# Patient Record
Sex: MALE | Race: ASIAN | NOT HISPANIC OR LATINO | URBAN - METROPOLITAN AREA
[De-identification: names, ages, dates, MRNs, and addresses within clinical notes are randomized per-mention and may not be internally consistent; named-entity substitution may affect disease eponyms.]

---

## 2019-12-31 ENCOUNTER — EMERGENCY (EMERGENCY)
Facility: HOSPITAL | Age: 19
LOS: 1 days | Discharge: HOME | End: 2019-12-31
Attending: EMERGENCY MEDICINE | Admitting: EMERGENCY MEDICINE
Payer: SELF-PAY

## 2019-12-31 VITALS
HEART RATE: 78 BPM | OXYGEN SATURATION: 100 % | RESPIRATION RATE: 16 BRPM | DIASTOLIC BLOOD PRESSURE: 82 MMHG | TEMPERATURE: 98 F | SYSTOLIC BLOOD PRESSURE: 138 MMHG

## 2019-12-31 LAB
ALBUMIN SERPL ELPH-MCNC: 4.5 G/DL — SIGNIFICANT CHANGE UP (ref 3.3–5)
ALP SERPL-CCNC: 57 U/L — SIGNIFICANT CHANGE UP (ref 40–120)
ALT FLD-CCNC: 17 U/L — SIGNIFICANT CHANGE UP (ref 4–41)
ANION GAP SERPL CALC-SCNC: 12 MMO/L — SIGNIFICANT CHANGE UP (ref 7–14)
AST SERPL-CCNC: 19 U/L — SIGNIFICANT CHANGE UP (ref 4–40)
BASOPHILS # BLD AUTO: 0.02 K/UL — SIGNIFICANT CHANGE UP (ref 0–0.2)
BASOPHILS NFR BLD AUTO: 0.2 % — SIGNIFICANT CHANGE UP (ref 0–2)
BILIRUB SERPL-MCNC: 0.3 MG/DL — SIGNIFICANT CHANGE UP (ref 0.2–1.2)
BUN SERPL-MCNC: 13 MG/DL — SIGNIFICANT CHANGE UP (ref 7–23)
CALCIUM SERPL-MCNC: 9 MG/DL — SIGNIFICANT CHANGE UP (ref 8.4–10.5)
CHLORIDE SERPL-SCNC: 101 MMOL/L — SIGNIFICANT CHANGE UP (ref 98–107)
CO2 SERPL-SCNC: 23 MMOL/L — SIGNIFICANT CHANGE UP (ref 22–31)
CREAT SERPL-MCNC: 0.75 MG/DL — SIGNIFICANT CHANGE UP (ref 0.5–1.3)
EOSINOPHIL # BLD AUTO: 0.08 K/UL — SIGNIFICANT CHANGE UP (ref 0–0.5)
EOSINOPHIL NFR BLD AUTO: 0.6 % — SIGNIFICANT CHANGE UP (ref 0–6)
GLUCOSE SERPL-MCNC: 83 MG/DL — SIGNIFICANT CHANGE UP (ref 70–99)
HCT VFR BLD CALC: 45.4 % — SIGNIFICANT CHANGE UP (ref 39–50)
HGB BLD-MCNC: 14.9 G/DL — SIGNIFICANT CHANGE UP (ref 13–17)
IMM GRANULOCYTES NFR BLD AUTO: 0.2 % — SIGNIFICANT CHANGE UP (ref 0–1.5)
LIDOCAIN IGE QN: 18.6 U/L — SIGNIFICANT CHANGE UP (ref 7–60)
LYMPHOCYTES # BLD AUTO: 16.3 % — SIGNIFICANT CHANGE UP (ref 13–44)
LYMPHOCYTES # BLD AUTO: 2.01 K/UL — SIGNIFICANT CHANGE UP (ref 1–3.3)
MCHC RBC-ENTMCNC: 30.3 PG — SIGNIFICANT CHANGE UP (ref 27–34)
MCHC RBC-ENTMCNC: 32.8 % — SIGNIFICANT CHANGE UP (ref 32–36)
MCV RBC AUTO: 92.5 FL — SIGNIFICANT CHANGE UP (ref 80–100)
MONOCYTES # BLD AUTO: 0.92 K/UL — HIGH (ref 0–0.9)
MONOCYTES NFR BLD AUTO: 7.5 % — SIGNIFICANT CHANGE UP (ref 2–14)
NEUTROPHILS # BLD AUTO: 9.27 K/UL — HIGH (ref 1.8–7.4)
NEUTROPHILS NFR BLD AUTO: 75.2 % — SIGNIFICANT CHANGE UP (ref 43–77)
NRBC # FLD: 0 K/UL — SIGNIFICANT CHANGE UP (ref 0–0)
PLATELET # BLD AUTO: 272 K/UL — SIGNIFICANT CHANGE UP (ref 150–400)
PMV BLD: 9.1 FL — SIGNIFICANT CHANGE UP (ref 7–13)
POTASSIUM SERPL-MCNC: 3.9 MMOL/L — SIGNIFICANT CHANGE UP (ref 3.5–5.3)
POTASSIUM SERPL-SCNC: 3.9 MMOL/L — SIGNIFICANT CHANGE UP (ref 3.5–5.3)
PROT SERPL-MCNC: 7.9 G/DL — SIGNIFICANT CHANGE UP (ref 6–8.3)
RBC # BLD: 4.91 M/UL — SIGNIFICANT CHANGE UP (ref 4.2–5.8)
RBC # FLD: 12.1 % — SIGNIFICANT CHANGE UP (ref 10.3–14.5)
SODIUM SERPL-SCNC: 136 MMOL/L — SIGNIFICANT CHANGE UP (ref 135–145)
WBC # BLD: 12.33 K/UL — HIGH (ref 3.8–10.5)
WBC # FLD AUTO: 12.33 K/UL — HIGH (ref 3.8–10.5)

## 2019-12-31 PROCEDURE — 99283 EMERGENCY DEPT VISIT LOW MDM: CPT

## 2019-12-31 RX ORDER — FAMOTIDINE 10 MG/ML
20 INJECTION INTRAVENOUS ONCE
Refills: 0 | Status: COMPLETED | OUTPATIENT
Start: 2019-12-31 | End: 2019-12-31

## 2019-12-31 RX ADMIN — FAMOTIDINE 20 MILLIGRAM(S): 10 INJECTION INTRAVENOUS at 11:11

## 2019-12-31 RX ADMIN — Medication 30 MILLILITER(S): at 11:11

## 2019-12-31 NOTE — ED PROVIDER NOTE - CLINICAL SUMMARY MEDICAL DECISION MAKING FREE TEXT BOX
Patient with epig abd pain since yesterday with nausea that is now resolved, no pmh, vss, abd nontender, very well appearing. diff dx gastritis v. pancreatitis v. gerd, plan for gi meds, labs, reass. likely gi f/u.

## 2019-12-31 NOTE — ED PROVIDER NOTE - PHYSICAL EXAMINATION
GEN - NAD; well appearing; A+O x3   HEAD - NC/AT   EYES- PERRL, EOMI  ENT: Airway patent, mmm, Oral cavity and pharynx normal.   NECK: Neck supple, non-tender without lymphadenopathy, no masses.   PULMONARY - CTA b/l, symmetric breath sounds.   CARDIAC -s1s2, RRR, no M,G,R  ABDOMEN - +BS, ND, NT, soft, no guarding, no rebound, no masses   BACK - no CVA tenderness, Normal  spine   EXTREMITIES - FROM, no edema   SKIN - no rash or bruising   NEUROLOGIC - alert, speech clear, no focal deficits  PSYCH -nl mood/affect, nl insight.

## 2019-12-31 NOTE — ED PROVIDER NOTE - PATIENT PORTAL LINK FT
You can access the FollowMyHealth Patient Portal offered by [facility] by registering at the following website: [email or web address]. By joining [employer]’s FollowMyHealth portal, you will also be able to view your health information using other applications (apps) compatible with our system.

## 2019-12-31 NOTE — ED ADULT NURSE NOTE - OBJECTIVE STATEMENT
Received pt into spot #12 in intake area. No acute distress noted. Pt c/o mid abdominal pain since yesterday. Denies any N/V/D, denies fever/chills, constipation. Abdomen soft/nondistended, nontender. Pt eval by Dr. Valdes, Blood work obtained and sent. Pepcid oral tab and Maalox given as ordered. pt taken to RW.

## 2019-12-31 NOTE — ED PROVIDER NOTE - OBJECTIVE STATEMENT
18 y/o m presents to the ed with abdominal pain. Started yesterday, epigastric, nonradiating, intermittent, had earlier this morning but resolved while in ed, no pain at this time. Had nausea previously which resolved. No fevers, chills, ha, cp, sob, vomiting, diarrhea, constipation, dysuria, hematuria, melena or brbrpr. No recent travel, no sick contacts.

## 2019-12-31 NOTE — ED PROVIDER NOTE - NS ED ROS FT
ROS:  GENERAL: No fever, no chills  EYES: no change in vision  HEENT: no trouble swallowing, no trouble speaking  CARDIAC: no chest pain  PULMONARY: no cough, no shortness of breath  GI: +epig pain and nausea now resolved, no vomiting, no diarrhea, no constipation  : No dysuria, no frequency, no change in appearance, or odor of urine  SKIN: no rashes  NEURO: no headache, no weakness  MSK: No joint pain

## 2019-12-31 NOTE — ED PROVIDER NOTE - NSFOLLOWUPINSTRUCTIONS_ED_ALL_ED_FT
Please take pepcid otc as directed on box which is available at your local pharmacy.  Please take tylenol 650mg every 4 hours as needed for pain.  Please follow up with gastroenterology clinic.  Please return for worsening pain, fevers, vomiting, bleeding or any new/worse problem. Please take pepcid otc as directed on box which is available at your local pharmacy.  Please take tylenol 650mg every 4 hours as needed for pain.  Please follow up with gastroenterology clinic-Call [phone], located at [address] [place] [address].  Please return for worsening pain, fevers, vomiting, bleeding or any new/worse problem.

## 2020-04-15 NOTE — ED PROVIDER NOTE - NO SIGNIFICANT PAST SURGICAL HISTORY
Take the Catapres as needed every 12 hours for opiate withdrawal symptoms. <<----- Click to add NO significant Past Surgical History

## 2020-08-06 ENCOUNTER — EMERGENCY (EMERGENCY)
Facility: HOSPITAL | Age: 20
LOS: 1 days | Discharge: HOME | End: 2020-08-06
Attending: EMERGENCY MEDICINE | Admitting: EMERGENCY MEDICINE
Payer: MEDICAID

## 2020-08-06 VITALS
HEIGHT: 65 IN | RESPIRATION RATE: 16 BRPM | SYSTOLIC BLOOD PRESSURE: 138 MMHG | OXYGEN SATURATION: 100 % | DIASTOLIC BLOOD PRESSURE: 100 MMHG | TEMPERATURE: 99 F | HEART RATE: 77 BPM | WEIGHT: 139.99 LBS

## 2020-08-06 VITALS
DIASTOLIC BLOOD PRESSURE: 57 MMHG | RESPIRATION RATE: 18 BRPM | HEART RATE: 84 BPM | TEMPERATURE: 98 F | OXYGEN SATURATION: 100 % | SYSTOLIC BLOOD PRESSURE: 111 MMHG

## 2020-08-06 PROBLEM — Z78.9 OTHER SPECIFIED HEALTH STATUS: Chronic | Status: ACTIVE | Noted: 2019-12-31

## 2020-08-06 PROCEDURE — 99283 EMERGENCY DEPT VISIT LOW MDM: CPT

## 2020-08-06 RX ORDER — MECLIZINE HCL 12.5 MG
1 TABLET ORAL
Qty: 12 | Refills: 0
Start: 2020-08-06 | End: 2020-08-09

## 2020-08-06 RX ORDER — MECLIZINE HCL 12.5 MG
50 TABLET ORAL ONCE
Refills: 0 | Status: COMPLETED | OUTPATIENT
Start: 2020-08-06 | End: 2020-08-06

## 2020-08-06 RX ADMIN — Medication 50 MILLIGRAM(S): at 12:29

## 2020-08-06 NOTE — ED ADULT NURSE NOTE - NS ED NURSE DISCH DISPOSITION
Notified Marlena Ashley [phone] and Andreas [phone] that transportation to [facility] set up for 12:30 today.  Marlena notified pt's son Bolanos.  No other questions or concerns.   Discharged

## 2020-08-06 NOTE — ED PROVIDER NOTE - ATTENDING CONTRIBUTION TO CARE
Dr. Anthony: I cared for this patient with a medical student.  The medical student documented in this chart as per guidelines.  I have personally seen and examined this patient and I have fully participated in their care.  I have reviewed all pertinent clinical information, including the history, physical exam, plan and medical student's documentation, and agree except as noted.  I have edited the medical student's note as I felt medically appropriate.  See above chart documentation for details.

## 2020-08-06 NOTE — ED ADULT TRIAGE NOTE - CHIEF COMPLAINT QUOTE
Pt c/o being dizzy upon waking this am with B/L knee pain  and lower back pain as per pt he passed out in the car on the way here  PMH: denies

## 2020-08-06 NOTE — ED PROVIDER NOTE - NSFOLLOWUPINSTRUCTIONS_ED_ALL_ED_FT
- Lab and imaging results, if performed, were discussed with you along with your discharge diagnosis    - Follow up with your doctor in 1 week - bring copies of your results if you were given. If you do not have a primary doctor, please call [phone] to find one convenient for you    - Return to the ED for any new, worsening, or concerning symptoms to you    - Continue all prescribed medications    - Rest and keep yourself hydrated with fluids

## 2020-08-06 NOTE — ED PROVIDER NOTE - NEUROLOGICAL, MLM
Alert and oriented, no focal deficits, no motor or sensory deficits. Alert and oriented, no focal deficits, no motor or sensory deficits.  Gait normal.  Speech normal.  Strength 5/5 in all 4 extremities.  EOMI/PERRLA.

## 2020-08-06 NOTE — ED PROVIDER NOTE - CLINICAL SUMMARY MEDICAL DECISION MAKING FREE TEXT BOX
young male with no sig PMH in ED with room spinning dizziness today, decreased when laying still and flat on stretcher, much worse with sitting up/position change; neuro exam nonfocal with exception of inducible dizziness--likely peripheral vertigo, not consistent with central process such as stroke or dissection; PLAN--treat symptoms, re-eval; low utility for acute imaging at this time

## 2020-08-06 NOTE — ED ADULT NURSE NOTE - OBJECTIVE STATEMENT
Pt presents to ED with dizziness since 8AM this morning. pt states when he stands up he feels dizzy. Pt AxOx3, ambulatory. Pt denies chest pain, lightheadedness. pt denies shortness of breath, breathing even and unlabored. Pt denies abd pain, n/v/d. pt denies other complaints at this time. Pt medicated as per MD orders. Will continue to monitor.

## 2020-08-06 NOTE — ED PROVIDER NOTE - PATIENT PORTAL LINK FT
You can access the FollowMyHealth Patient Portal offered by [facility] by registering at the following website: [email or web address]. By joining [facility]’s FollowMyHealth portal, you will also be able to view your health information using other applications (apps) compatible with our system.

## 2020-08-06 NOTE — ED PROVIDER NOTE - PROGRESS NOTE DETAILS
- Guille Erazo, MS4: The patient reports his dizziness is much improved after taking meclizine. He appears comfortable and has no other concerns at this time.

## 2020-08-06 NOTE — ED PROVIDER NOTE - ENMT NEGATIVE STATEMENT, MLM
Ears: no tinnitus and no hearing problems. Nose: no nasal congestion Mouth/Throat: no dysphagia, no hoarseness and no throat pain. Neck: no pain and no stiffness

## 2020-08-06 NOTE — ED ADULT NURSE NOTE - PRIMARY CARE PROVIDER
Anesthesia Evaluation     Patient summary reviewed and Nursing notes reviewed   NPO Solid Status: > 8 hours  NPO Liquid Status: > 2 hours           Airway   Mallampati: II  Dental - normal exam     Pulmonary - negative pulmonary ROS and normal exam   Cardiovascular - normal exam    (+) hyperlipidemia,       Neuro/Psych- negative ROS  GI/Hepatic/Renal/Endo    (+)   diabetes mellitus,     Musculoskeletal (-) negative ROS    Abdominal    Substance History - negative use     OB/GYN          Other - negative ROS                       Anesthesia Plan    ASA 2     MAC         
 unknown at this time

## 2020-08-06 NOTE — ED PROVIDER NOTE - OBJECTIVE STATEMENT
This is a 20y Male with no significant PMHx presenting with x1 day of dizziness. He reports the dizziness is worse when standing and is accompanied by feelings of generalized weakness and dull pain/aching of the lower extremity when standing or walking. He denies fevers/chills, chest pain, shortness of breath, cough, sneezing, n/v/d, or abdominal pain. He works as a  and notes his supervisor recently tested COVID+. This is a 20y Male with no significant PMHx presenting with x1 day of dizziness. He reports the dizziness is worse with movement and is accompanied by feelings of generalized weakness and dull pain/aching of the lower extremity when standing or walking. He denies fevers/chills, chest pain, shortness of breath, cough, sneezing, n/v/d, or abdominal pain. He works as a  and regularly lifts heavy objects. This is a 20y Male with no significant PMHx presenting with x1 day of dizziness. He reports the dizziness is room spinning and worse with movement and is accompanied by feelings of generalized weakness and dull pain/aching of the lower extremity when standing or walking. He denies fevers/chills, chest pain, shortness of breath, cough, sneezing, n/v/d, or abdominal pain. No ataxia or speech difficulty.  He works as a  and regularly lifts heavy objects.

## 2020-08-24 ENCOUNTER — EMERGENCY (EMERGENCY)
Facility: HOSPITAL | Age: 20
LOS: 1 days | Discharge: HOME | End: 2020-08-24
Attending: EMERGENCY MEDICINE | Admitting: EMERGENCY MEDICINE
Payer: MEDICAID

## 2020-08-24 VITALS
SYSTOLIC BLOOD PRESSURE: 126 MMHG | OXYGEN SATURATION: 100 % | RESPIRATION RATE: 18 BRPM | DIASTOLIC BLOOD PRESSURE: 91 MMHG | HEART RATE: 69 BPM | TEMPERATURE: 98 F

## 2020-08-24 VITALS
SYSTOLIC BLOOD PRESSURE: 129 MMHG | RESPIRATION RATE: 18 BRPM | OXYGEN SATURATION: 100 % | DIASTOLIC BLOOD PRESSURE: 73 MMHG | HEART RATE: 67 BPM | TEMPERATURE: 98 F

## 2020-08-24 PROCEDURE — 99283 EMERGENCY DEPT VISIT LOW MDM: CPT

## 2020-08-24 PROCEDURE — 71046 X-RAY EXAM CHEST 2 VIEWS: CPT | Mod: 26

## 2020-08-24 RX ORDER — IBUPROFEN 200 MG
800 TABLET ORAL ONCE
Refills: 0 | Status: COMPLETED | OUTPATIENT
Start: 2020-08-24 | End: 2020-08-24

## 2020-08-24 RX ORDER — DICLOFENAC SODIUM 75 MG/1
1 TABLET, DELAYED RELEASE ORAL
Qty: 10 | Refills: 0
Start: 2020-08-24 | End: 2020-08-28

## 2020-08-24 RX ADMIN — Medication 800 MILLIGRAM(S): at 15:42

## 2020-08-24 NOTE — ED PROVIDER NOTE - PATIENT PORTAL LINK FT
You can access the FollowMyHealth Patient Portal offered by [facility] by registering at the following website: [email or web address]. By joining [employer]’s FollowMyHealth portal, you will also be able to view your health information using other applications (apps) compatible with our system.

## 2020-08-24 NOTE — ED PROVIDER NOTE - OBJECTIVE STATEMENT
19 y/o male no pmh presents to ER c/o chest pain x 1 week. Pt. states roughly 1 week ago developed right sided chest pain that was intermittent but over past 2-3 days the pain has become more constant - rates pain 9/10 sharp. Pt. states pain is worsen with movement, worse when he takes a deep breath and is also tender when he presses the area. tried taking gas-x with minimal relief. Denies fever chills nausea vomit weakness dizziness loc.

## 2020-08-24 NOTE — ED ADULT TRIAGE NOTE - CHIEF COMPLAINT QUOTE
Pt c/o 3 weeks of right sided chest pain, with SOB, worse with movement. Pt states the pain was intermittent for the first 2 weeks and now constant. Denies recent illness, denies fevers/chills, denies travel.

## 2020-08-24 NOTE — ED PROVIDER NOTE - CLINICAL SUMMARY MEDICAL DECISION MAKING FREE TEXT BOX
19 y/o male c/o reproduicble chest wall apin x 1 week  -probable costochondritis vs msk cause  -cxr, ekg  -nsaids  -reassess Thelma att: 21 y/o male c/o reproducible chest wall pain x 1 week  -probable costochondritis vs msk cause  -cxr, ekg  -nsaids  -reassess

## 2020-08-24 NOTE — ED ADULT NURSE NOTE - OBJECTIVE STATEMENT
pt received to intake 10b, aox3.  pt c/o chest pain x several days on and off.  pt reports pain has been constant x a few days.  pt appears in [facility]A at bedside

## 2022-04-16 NOTE — ED PROVIDER NOTE - PROGRESS NOTE DETAILS
patient pain free, alejandro po without difficulty. all results d/w patient and copies given. Return precautions discussed. Instructed to f/u with gi as outpatient in next few days. Clinic number given. Ao3, well appearing, ok for dc.
4 = No assist / stand by assistance